# Patient Record
Sex: FEMALE | Race: WHITE
[De-identification: names, ages, dates, MRNs, and addresses within clinical notes are randomized per-mention and may not be internally consistent; named-entity substitution may affect disease eponyms.]

---

## 2017-06-05 ENCOUNTER — HOSPITAL ENCOUNTER (INPATIENT)
Dept: HOSPITAL 47 - EC | Age: 72
LOS: 2 days | Discharge: HOME | DRG: 310 | End: 2017-06-07
Payer: MEDICARE

## 2017-06-05 VITALS — BODY MASS INDEX: 26.4 KG/M2

## 2017-06-05 DIAGNOSIS — Z79.01: ICD-10-CM

## 2017-06-05 DIAGNOSIS — K21.9: ICD-10-CM

## 2017-06-05 DIAGNOSIS — I25.10: ICD-10-CM

## 2017-06-05 DIAGNOSIS — Z79.899: ICD-10-CM

## 2017-06-05 DIAGNOSIS — I49.5: ICD-10-CM

## 2017-06-05 DIAGNOSIS — Z90.12: ICD-10-CM

## 2017-06-05 DIAGNOSIS — M19.90: ICD-10-CM

## 2017-06-05 DIAGNOSIS — Z80.41: ICD-10-CM

## 2017-06-05 DIAGNOSIS — E78.5: ICD-10-CM

## 2017-06-05 DIAGNOSIS — Z79.82: ICD-10-CM

## 2017-06-05 DIAGNOSIS — Z87.01: ICD-10-CM

## 2017-06-05 DIAGNOSIS — N18.3: ICD-10-CM

## 2017-06-05 DIAGNOSIS — Z88.8: ICD-10-CM

## 2017-06-05 DIAGNOSIS — Z92.21: ICD-10-CM

## 2017-06-05 DIAGNOSIS — Z86.69: ICD-10-CM

## 2017-06-05 DIAGNOSIS — R74.8: ICD-10-CM

## 2017-06-05 DIAGNOSIS — Z87.19: ICD-10-CM

## 2017-06-05 DIAGNOSIS — I12.9: ICD-10-CM

## 2017-06-05 DIAGNOSIS — I48.1: ICD-10-CM

## 2017-06-05 DIAGNOSIS — Z80.49: ICD-10-CM

## 2017-06-05 DIAGNOSIS — Z86.19: ICD-10-CM

## 2017-06-05 DIAGNOSIS — Z82.49: ICD-10-CM

## 2017-06-05 DIAGNOSIS — Z86.73: ICD-10-CM

## 2017-06-05 DIAGNOSIS — I42.9: ICD-10-CM

## 2017-06-05 DIAGNOSIS — I48.2: Primary | ICD-10-CM

## 2017-06-05 DIAGNOSIS — H26.9: ICD-10-CM

## 2017-06-05 DIAGNOSIS — Z86.79: ICD-10-CM

## 2017-06-05 DIAGNOSIS — Z95.0: ICD-10-CM

## 2017-06-05 DIAGNOSIS — Z80.0: ICD-10-CM

## 2017-06-05 DIAGNOSIS — E87.6: ICD-10-CM

## 2017-06-05 DIAGNOSIS — Z83.49: ICD-10-CM

## 2017-06-05 DIAGNOSIS — Z85.3: ICD-10-CM

## 2017-06-05 DIAGNOSIS — I49.3: ICD-10-CM

## 2017-06-05 LAB — CK SERPL-CCNC: 54 U/L (ref 30–135)

## 2017-06-05 PROCEDURE — 84484 ASSAY OF TROPONIN QUANT: CPT

## 2017-06-05 PROCEDURE — 93306 TTE W/DOPPLER COMPLETE: CPT

## 2017-06-05 PROCEDURE — 84443 ASSAY THYROID STIM HORMONE: CPT

## 2017-06-05 PROCEDURE — 84132 ASSAY OF SERUM POTASSIUM: CPT

## 2017-06-05 PROCEDURE — 96365 THER/PROPH/DIAG IV INF INIT: CPT

## 2017-06-05 PROCEDURE — 96366 THER/PROPH/DIAG IV INF ADDON: CPT

## 2017-06-05 PROCEDURE — 82550 ASSAY OF CK (CPK): CPT

## 2017-06-05 PROCEDURE — 80053 COMPREHEN METABOLIC PANEL: CPT

## 2017-06-05 PROCEDURE — 85025 COMPLETE CBC W/AUTO DIFF WBC: CPT

## 2017-06-05 PROCEDURE — 36415 COLL VENOUS BLD VENIPUNCTURE: CPT

## 2017-06-05 PROCEDURE — 93005 ELECTROCARDIOGRAM TRACING: CPT

## 2017-06-05 PROCEDURE — 99285 EMERGENCY DEPT VISIT HI MDM: CPT

## 2017-06-05 PROCEDURE — 82553 CREATINE MB FRACTION: CPT

## 2017-06-05 PROCEDURE — 83735 ASSAY OF MAGNESIUM: CPT

## 2017-06-05 RX ADMIN — LISINOPRIL SCH MG: 20 TABLET ORAL at 20:08

## 2017-06-05 RX ADMIN — METOPROLOL TARTRATE SCH MG: 50 TABLET, FILM COATED ORAL at 20:09

## 2017-06-05 NOTE — ED
General Adult HPI





- General


Chief complaint: Chest Pain


Stated complaint: weakness, afib, pacemaker problem


Time Seen by Provider: 06/05/17 16:14


Source: EMS


Mode of arrival: EMS


Limitations: no limitations, altered mental status





- History of Present Illness


Initial comments: 





71-year-old white female presents as a transfer from [facility].  She barely presented to their facility with atrial 

fibrillation with rapid ventricular response.  She states that this morning she 

developed some dizziness to the point that she felt as though she may pass out.

  She also was short of breath.  She denies any chest pain.  She otherwise had 

been doing just fine and denies any other medical complaints.  She does have a 

history of a pacemaker being placed approximately 9 months ago in [place].  She 

also has a history of atrial fibrillation.  Her primary cardiologist is Dr. Mcdonald.  She initially received 5 mg of Lopressor at the other facility without 

any change in the heart rate.  She was started on some Cardizem but developed a 

rash as well as some itching and this was stopped.  She received some Benadryl 

afterwards with relief.  She then was started on amiodarone intravenously.  She 

denies any further shortness of breath or dizziness but upon arrival her heart 

rate still is approximately 140.  No other complaints or modifying factors.





- Related Data


 Home Medications











 Medication  Instructions  Recorded  Confirmed


 


Apixaban [Eliquis] 5 mg PO DAILY 06/05/17 06/05/17


 


Aspirin 81 mg PO DAILY 06/05/17 06/05/17


 


Carvedilol [Coreg] 25 mg PO BID 06/05/17 06/05/17


 


Furosemide [Lasix] 40 mg PO BID 06/05/17 06/05/17


 


Lisinopril 20 mg PO BID 06/05/17 06/05/17


 


hydrALAZINE HCL [Apresoline] 25 mg PO TID 06/05/17 06/05/17











 Allergies











Allergy/AdvReac Type Severity Reaction Status Date / Time


 


amlodipine Allergy  Rash/Hives Verified 06/05/17 17:37


 


diltiazem HCl [From Cardizem] Allergy  Rash/Hives Verified 06/05/17 17:37


 


hydrochlorothiazide AdvReac  Vertigo Verified 06/05/17 17:37


 


rivaroxaban [From Xarelto] AdvReac  Vertigo Verified 06/05/17 17:37


 


triamterene [From Dyazide] AdvReac  Vertigo Verified 06/05/17 17:37














Review of Systems


ROS Statement: 


Those systems with pertinent positive or pertinent negative responses have been 

documented in the HPI.





ROS Other: All systems not noted in ROS Statement are negative.





Past Medical History


Past Medical History: Atrial Fibrillation, Cancer, Heart Failure, CVA/TIA, GERD/

Reflux, Hypertension, Pneumonia


Additional Past Medical History / Comment(s): Afib with RVR in the past, left 

breast CA with surgery/chemo, TIA, right lower lobe pneumonia, cataracts 

bilaterally, anemia, tinnitis bilaterally, numbness/tingling at times bilateral 

hands, hemorrhoids.


History of Any Multi-Drug Resistant Organisms: ESBL


Date of last positivie culture/infection: 6/9/16 ESBL-E.coli


MDRO Source:: Urine


Past Surgical History: Heart Catheterization, Pacemaker


Additional Past Surgical History / Comment(s): 12/29/15  cardiac cath-treated 

medically, left mastectomy, tumor removed behind pt right ear, colonoscopy. 

pacemaker 8/2016


Past Anesthesia/Blood Transfusion Reactions: Motion Sickness


Additional Past Anesthesia/Blood Transfusion Reaction / Comment(s): Difficulty 

waking up after one surgery.


Past Psychological History: No Psychological Hx Reported


Additional Psychological History / Comment(s): Pt resides alone.  She is 

independent.  She uses no assistive device.  She drives.


Smoking Status: Never smoker


Past Alcohol Use History: None Reported


Past Drug Use History: None Reported





- Past Family History


  ** Father


Family Medical History: Myocardial Infarction (MI)





  ** Mother


Family Medical History: Cancer


Additional Family Medical History / Comment(s): colon cancer





  ** Sister(s)


Family Medical History: Cancer, Thyroid Disorder


Additional Family Medical History / Comment(s): cervical cancer and ovarian 

cancer





General Exam





- General Exam Comments


Initial Comments: 





GENERAL: The patient is well nourished and well hydrated. 


VITAL SIGNS: Heart rate, blood pressure, respiratory rate reviewed as recorded 

in nurse's notes. 


EYES: Pupils are round and reactive. Extraocular movements are intact. No 

conjunctival / lid redness or swelling. 


ENT: No external evidence of injury, swelling, or ecchymosis. Airway is patent. 

Throat is clear. 


NECK: Nontender. No swelling or evidence of injury. No subcutaneous emphysema. 

Trachea is midline. No thyroid mass. 


HEART: Tachycardic and irregular heart rate.  Good peripheral pulses. 


LUNGS/CHEST: Breath sounds clear and equal bilaterally. No rales, rhonchi, or 

wheezes. No ecchymosis, subcutaneous emphysema, or tenderness. 


ABDOMEN: Abdomen soft without tenderness. No palpable masses or organomegaly. 

No peritoneal signs. No abdominal wall swelling or ecchymosis. 


EXTREMITIES: No extremity tenderness. Normal muscle tone and function. No 

thoracolumbar tenderness. 


NEUROLOGIC: Sensation is grossly intact. Cranial nerve exam reveals face is 

symmetrical, tongue is midline, speech is clear. 


SKIN: No abrasions or ecchymosis is noted. No induration or masses noted. 


PSYCHIATRIC: Alert and oriented. Appropriate behavior and judgment.





Limitations: no limitations, altered mental status





Course


 Vital Signs











  06/05/17 06/05/17 06/05/17





  16:12 16:54 17:23


 


Temperature 98.7 F  


 


Pulse Rate 120 H 133 H 138 H


 


Respiratory 18 18 





Rate   


 


Blood Pressure 166/95 161/89 144/96


 


O2 Sat by Pulse 98 98 98





Oximetry   














  06/05/17 06/05/17





  17:56 18:23


 


Temperature  


 


Pulse Rate 136 H 126 H


 


Respiratory  





Rate  


 


Blood Pressure 176/87 139/79


 


O2 Sat by Pulse 98 98





Oximetry  














Medical Decision Making





- Medical Decision Making





The patient was seen and examined.  All diagnostics were reviewed.  She does 

present with records from [place].  The hospitalization are reviewed.  She 

apparently had an elevation of her troponin per records.  The troponin was 

found to be 0.28 with the high end of normal being 0.04.  The potassium was 

slightly low at 3.2.  The glucose was slightly elevated at 144.  The amiodarone 

drip apparently backed up into the IV bag so she is not on the amiodarone upon 

arrival.  This is restarted.The troponin was found to be 0.454 on recheck which 

is slightly elevated.  The patient's heart rate on recheck is approximately 125 

and still appears to be in atrial fibrillation with rapid ventricular response.

  The case is discussed with Dr. Mcdonald and he would like metoprolol 50 mg twice 

a day added to her regimen.  She is doing well on recheck.  She states that she 

has had occasional episodes of dizziness but otherwise no other complaints.  It 

is felt as though she would require admission to the hospital.  The case is 

discussed with Maude from internal medicine and she is agreeable to admission.  

Approximately 30 minutes of critical care time is utilized and the treatment of 

this patient.





- Lab Data


 Lab Results











  06/05/17 Range/Units





  16:27 


 


Troponin I  0.454 H*  (0.000-0.034)  ng/mL














Disposition


Clinical Impression: 


 Atrial fibrillation with rapid ventricular response, Dyspnea, Dizziness, 

Elevated troponin





Disposition: ADMITTED AS IP TO THIS HOSP


Condition: Fair


Time of Disposition: 18:44


Decision Date: 06/05/17


Decision Time: 18:44

## 2017-06-06 LAB
ALP SERPL-CCNC: 68 U/L (ref 38–126)
ALT SERPL-CCNC: 31 U/L (ref 9–52)
ANION GAP SERPL CALC-SCNC: 5 MMOL/L
AST SERPL-CCNC: 26 U/L (ref 14–36)
BASOPHILS # BLD AUTO: 0 K/UL (ref 0–0.2)
BASOPHILS NFR BLD AUTO: 0 %
BUN SERPL-SCNC: 30 MG/DL (ref 7–17)
CALCIUM SPEC-MCNC: 9.3 MG/DL (ref 8.4–10.2)
CH: 33.1
CHCM: 35
CHLORIDE SERPL-SCNC: 112 MMOL/L (ref 98–107)
CK SERPL-CCNC: 57 U/L (ref 30–135)
CO2 SERPL-SCNC: 27 MMOL/L (ref 22–30)
EOSINOPHIL # BLD AUTO: 0.4 K/UL (ref 0–0.7)
EOSINOPHIL NFR BLD AUTO: 4 %
ERYTHROCYTE [DISTWIDTH] IN BLOOD BY AUTOMATED COUNT: 3.54 M/UL (ref 3.8–5.4)
ERYTHROCYTE [DISTWIDTH] IN BLOOD: 12.6 % (ref 11.5–15.5)
GLUCOSE SERPL-MCNC: 97 MG/DL (ref 74–99)
HCT VFR BLD AUTO: 33.6 % (ref 34–46)
HDW: 2.74
HGB BLD-MCNC: 11.7 GM/DL (ref 11.4–16)
LUC NFR BLD AUTO: 1 %
LYMPHOCYTES # SPEC AUTO: 2.3 K/UL (ref 1–4.8)
LYMPHOCYTES NFR SPEC AUTO: 23 %
MCH RBC QN AUTO: 33 PG (ref 25–35)
MCHC RBC AUTO-ENTMCNC: 34.7 G/DL (ref 31–37)
MCV RBC AUTO: 94.9 FL (ref 80–100)
MONOCYTES # BLD AUTO: 0.6 K/UL (ref 0–1)
MONOCYTES NFR BLD AUTO: 6 %
NEUTROPHILS # BLD AUTO: 6.5 K/UL (ref 1.3–7.7)
NEUTROPHILS NFR BLD AUTO: 66 %
NON-AFRICAN AMERICAN GFR(MDRD): 49
POTASSIUM SERPL-SCNC: 3.4 MMOL/L (ref 3.5–5.1)
PROT SERPL-MCNC: 5.7 G/DL (ref 6.3–8.2)
SODIUM SERPL-SCNC: 144 MMOL/L (ref 137–145)
TROPONIN I SERPL-MCNC: 0.46 NG/ML (ref 0–0.03)
TROPONIN I SERPL-MCNC: 0.52 NG/ML (ref 0–0.03)
WBC # BLD AUTO: 0.13 10*3/UL
WBC # BLD AUTO: 9.9 K/UL (ref 3.8–10.6)
WBC (PEROX): 9.66

## 2017-06-06 RX ADMIN — FUROSEMIDE SCH MG: 40 TABLET ORAL at 16:11

## 2017-06-06 RX ADMIN — ASPIRIN 81 MG CHEWABLE TABLET SCH MG: 81 TABLET CHEWABLE at 09:24

## 2017-06-06 RX ADMIN — AMIODARONE HYDROCHLORIDE SCH MLS/HR: 50 INJECTION, SOLUTION INTRAVENOUS at 09:25

## 2017-06-06 RX ADMIN — AMIODARONE HYDROCHLORIDE SCH MG: 200 TABLET ORAL at 11:53

## 2017-06-06 RX ADMIN — POTASSIUM CHLORIDE SCH MEQ: 20 TABLET, EXTENDED RELEASE ORAL at 06:24

## 2017-06-06 RX ADMIN — FUROSEMIDE SCH MG: 40 TABLET ORAL at 09:24

## 2017-06-06 RX ADMIN — METOPROLOL TARTRATE SCH MG: 50 TABLET, FILM COATED ORAL at 09:23

## 2017-06-06 RX ADMIN — LISINOPRIL SCH MG: 20 TABLET ORAL at 20:53

## 2017-06-06 RX ADMIN — METOPROLOL TARTRATE SCH MG: 50 TABLET, FILM COATED ORAL at 16:11

## 2017-06-06 RX ADMIN — AMIODARONE HYDROCHLORIDE SCH: 50 INJECTION, SOLUTION INTRAVENOUS at 18:01

## 2017-06-06 RX ADMIN — POTASSIUM CHLORIDE SCH MEQ: 20 TABLET, EXTENDED RELEASE ORAL at 07:53

## 2017-06-06 RX ADMIN — LISINOPRIL SCH MG: 20 TABLET ORAL at 09:23

## 2017-06-06 RX ADMIN — METOPROLOL TARTRATE SCH MG: 50 TABLET, FILM COATED ORAL at 20:55

## 2017-06-06 RX ADMIN — AMIODARONE HYDROCHLORIDE SCH MG: 200 TABLET ORAL at 20:53

## 2017-06-06 RX ADMIN — APIXABAN SCH MG: 5 TABLET, FILM COATED ORAL at 09:23

## 2017-06-06 RX ADMIN — AMIODARONE HYDROCHLORIDE SCH MLS/HR: 50 INJECTION, SOLUTION INTRAVENOUS at 01:14

## 2017-06-06 NOTE — CONS
DATE OF CONSULTATION:  



CHIEF COMPLAINT:  Dizziness and fast heart rate.



Kirsty is a 71-year-old lady with history of chronic atrial 

fibrillation and hypertension.  Patient has history of chronic atrial 

fibrillation, sick sinus syndrome, status post permanent pacemaker, 

known cardiomyopathy with chronic atrial fibrillation, coronary artery 

disease who presented to the hospital complaining of dizziness and was 

found to be in atrial fibrillation with rapid ventricular rate. At the 

time of my evaluation this morning, she is in atrial fibrillation with 

fairly well controlled ventricular rate and blood pressure is normal.  



She is on Eliquis, Apresoline, Lopressor 50 b.i.d. with amiodarone. 



Patient is allergic to AMLODIPINE, HYDROCHLOROTHIAZIDE, XARELTO and 

DYAZIDE.  



Medications at home included hydralazine 25 t.i.d., lisinopril 20 

b.i.d., Lasix 40 b.i.d., Coreg 25 b.i.d. aspirin and Eliquis.  



Family history is negative for premature coronary artery disease. 



Social history is negative for current smoking, EtOH abuse, or drug 

abuse.  



REVIEW OF SYSTEMS: 

HEENT is unremarkable. 

CARDIAC: As described above. 

RESPIRATORY: Negative. 

GI: Negative. 

GENITOURINARY: Negative. 

ALLERGY/IMMUNOLOGY: Negative. 

SKIN: Negative. 

MUSCULOSKELETAL: Significant for arthritis. 

PSYCHOSOCIAL: Negative. 

ENDOCRINE:  Negative. 

CONSTITUTIONAL: Negative. 

ONCOLOGICAL: Negative. 



The rest of the system review is not relevant.



On exam, patient is comfortable at rest. Heart rate is around 112 

beats per minute, blood pressure is 140/95, respiratory rate is 18, O2 

sat is 98% on room air.  

There is no jugular venous distention. Carotid upstroke is normal. 

There is no bruit. Chest exam reveals diminished air entry at the 

bases. Heart exam reveals first and second heart sound with systolic 

murmur at the apex. Abdomen is soft. Exam of the extremities did not 

reveal edema. Peripheral pulses are felt.  



Labs show a hemoglobin of 11.7. Three sets of troponins are 0.4, 0.5 

and 0.4. Potassium is 3.4. Creatinine is 1.1.  



ASSESSMENT: 

1. Chronic atrial fibrillation with poorly-controlled ventricular rate. 

2. Sick sinus syndrome, status post permanent pacemaker placement. 



PLAN: I am going to add amiodarone 200 mg b.i.d. and increase the dose 

of Lopressor to 50 t.i.d. We should be able to discharge her home over 

the next day or two.

## 2017-06-07 VITALS — HEART RATE: 63 BPM | SYSTOLIC BLOOD PRESSURE: 150 MMHG | DIASTOLIC BLOOD PRESSURE: 79 MMHG

## 2017-06-07 VITALS — TEMPERATURE: 97 F | RESPIRATION RATE: 18 BRPM

## 2017-06-07 LAB
ALP SERPL-CCNC: 72 U/L (ref 38–126)
ALT SERPL-CCNC: 32 U/L (ref 9–52)
ANION GAP SERPL CALC-SCNC: 8 MMOL/L
AST SERPL-CCNC: 43 U/L (ref 14–36)
BASOPHILS # BLD AUTO: 0.1 K/UL (ref 0–0.2)
BASOPHILS NFR BLD AUTO: 1 %
BUN SERPL-SCNC: 30 MG/DL (ref 7–17)
CALCIUM SPEC-MCNC: 9.3 MG/DL (ref 8.4–10.2)
CH: 32.9
CHCM: 33.4
CHLORIDE SERPL-SCNC: 113 MMOL/L (ref 98–107)
CO2 SERPL-SCNC: 22 MMOL/L (ref 22–30)
EOSINOPHIL # BLD AUTO: 0.4 K/UL (ref 0–0.7)
EOSINOPHIL NFR BLD AUTO: 4 %
ERYTHROCYTE [DISTWIDTH] IN BLOOD BY AUTOMATED COUNT: 3.61 M/UL (ref 3.8–5.4)
ERYTHROCYTE [DISTWIDTH] IN BLOOD: 13.1 % (ref 11.5–15.5)
GLUCOSE SERPL-MCNC: 112 MG/DL (ref 74–99)
HCT VFR BLD AUTO: 35.8 % (ref 34–46)
HDW: 2.52
HGB BLD-MCNC: 11.9 GM/DL (ref 11.4–16)
LUC NFR BLD AUTO: 1 %
LYMPHOCYTES # SPEC AUTO: 2.3 K/UL (ref 1–4.8)
LYMPHOCYTES NFR SPEC AUTO: 24 %
MCH RBC QN AUTO: 32.9 PG (ref 25–35)
MCHC RBC AUTO-ENTMCNC: 33.2 G/DL (ref 31–37)
MCV RBC AUTO: 99.1 FL (ref 80–100)
MONOCYTES # BLD AUTO: 0.3 K/UL (ref 0–1)
MONOCYTES NFR BLD AUTO: 3 %
NEUTROPHILS # BLD AUTO: 6.4 K/UL (ref 1.3–7.7)
NEUTROPHILS NFR BLD AUTO: 68 %
NON-AFRICAN AMERICAN GFR(MDRD): 52
POTASSIUM SERPL-SCNC: 4.9 MMOL/L (ref 3.5–5.1)
PROT SERPL-MCNC: 6.4 G/DL (ref 6.3–8.2)
SODIUM SERPL-SCNC: 143 MMOL/L (ref 137–145)
WBC # BLD AUTO: 0.08 10*3/UL
WBC # BLD AUTO: 9.4 K/UL (ref 3.8–10.6)
WBC (PEROX): 8.78

## 2017-06-07 RX ADMIN — APIXABAN SCH MG: 5 TABLET, FILM COATED ORAL at 08:04

## 2017-06-07 RX ADMIN — ASPIRIN 81 MG CHEWABLE TABLET SCH MG: 81 TABLET CHEWABLE at 08:04

## 2017-06-07 RX ADMIN — LISINOPRIL SCH MG: 20 TABLET ORAL at 08:05

## 2017-06-07 RX ADMIN — METOPROLOL TARTRATE SCH MG: 50 TABLET, FILM COATED ORAL at 08:05

## 2017-06-07 RX ADMIN — AMIODARONE HYDROCHLORIDE SCH MG: 200 TABLET ORAL at 08:03

## 2017-06-07 RX ADMIN — FUROSEMIDE SCH MG: 40 TABLET ORAL at 08:04

## 2017-06-07 NOTE — HP
DATE OF ADMISSION:  06/05/2017





REASON FOR ADMISSION: Syncope. 



HISTORY OF PRESENT ILLNESS: This is a 71 -year-old lady with history 

of chronic atrial fibrillation, status post permanent pacemaker 

placement due to sick sinus syndrome, comes in the hospital with an 

episode of syncope. Patient stated that she noted diffuse (    ) she 

noted lightheadedness thereafter, lost consciousness for a brief 

period.  Patient did state to have palpitations during that period.  



The patient came into the hospital with complaints of lightheadedness. 

Patient states that she lost consciousness briefly, thereafter was 

noted to have regained consciousness, however, was able to call 911 

and hence, came into the emergency room via EMS.  



Patient was noted to be in atrial fibrillation with rapid ventricular 

rate. Blood pressure was within normal limits.  



A fourteen-point review of systems was done; none pertinent other than 

ones mentioned above.  



Home medications include: 

1. Hydralazine.

2. Lisinopril.

3. Lasix.

4. Coreg.

5. Aspirin.

6. Eliquis. 



ALLERGIES: AMLODIPINE, CARDIZEM, HYDROCHLOROTHIAZIDE, XARELTO, 

TRIAMTERENE.  



Past medical history includes chronic atrial fibrillation, 

hypertension, and chronic atrial fibrillation.  



PAST SURGICAL HISTORY: Pacemaker placement, cardiac catheterization, 

colonoscopy and previous left-sided mastectomy, previous tumor removal 

behind the right ear. 



SOCIAL HISTORY: Lifelong nonsmoker. No alcohol or illicit drug use 

reported.  



FAMILY HISTORY: Significant cervical and ovarian cancer, however, is 

not pertinent to current admission.  



PHYSICAL EXAM:

VITALS: Temperature is 97.4, heart rate is between 105 to 120, 

respiratory rate 18, blood pressure 142/83, saturating 98% on room 

air.  

GENERAL APPEARANCE: Alert, oriented x3. Does not appear to be in 

distress.  

NECK: Supple. No JVD. 

HEART: Irregularly irregular. No murmurs appreciated. 

LUNGS: Good air movement. Clear to auscultation. No rhonchi, wheezing 

or crackles.  

ABDOMEN: Soft, nontender, no organomegaly. Bowel sounds intact.

NEUROLOGICAL: No focal motor or sensory deficits noted. 

LOWER EXTREMITIES: No edema noted. 



Laboratory data include hemoglobin 11.1, hematocrit 32.6, white count 

9.9, platelets of 217, sodium 144, potassium 3.24, chloride 112, 

bicarb 27. BUN 30, creatinine of 1.10, peak troponin of 0.518.  



ASSESSMENT AND PLAN:

1. Atrial fibrillation with rapid ventricular rate in a patient with 

chronic atrial fibrillation.  

2. Sick sinus syndrome. 

3. Chronic kidney disease Stage III. 

4. Troponin leak, likely due to tachyarrhythmia. 

5. History of hypertension. 

6. Previous breast cancer status post left-sided mastectomy. 

7. Hypokalemia. 



PLAN: Continue amiodarone drip as recommended by cardiology. We will 

obtain a TSH for the a.m. Echocardiogram was also ordered. Once the 

patient is better rate controlled, will likely be able to go home. 

Patient was already anticoagulated. Will follow.

## 2017-06-07 NOTE — P.PN
Subjective


Principal diagnosis: 





Atrial fibrillation


This is a 71-year-old  female with history of chronic persistent 

atrial fibrillation, hypertension, sick sinus syndrome with prior pacemaker 

implantation, with follows regularly with Dr. Cade in the office.  She 

presented to the hospital with symptoms of dizziness and heart racing.  She was 

found to be in atrial fibrillation with a rapid ventricular response.  Patient 

was seen in consultation yesterday by Dr. Gamez.  Amiodarone 200 mg one tablet 

by mouth twice a day was added to her medication regime, and her dose of 

Lopressor increased to 50 mg by mouth 3 times a day.  Patient's potassium was 

also found to be low at 3.4 and was replaced.  Heart rate has been under 

adequate control, pacemaker has been functioning appropriately.  Blood pressure 

148/90 heart rate in the 90s.  Potassium today 4.9, BUN 30, creatinine 1.0.  

Shunt was seen and examined, feels well today, denies any dizziness, no 

palpitations.





Objective





- Vital Signs


Vital signs: 


 Vital Signs











Temp  97.5 F L  06/07/17 03:47


 


Pulse  110 H  06/07/17 03:47


 


Resp  17   06/07/17 03:47


 


BP  160/84   06/07/17 03:47


 


Pulse Ox  95   06/07/17 03:47








 Intake & Output











 06/06/17 06/07/17 06/07/17





 18:59 06:59 18:59


 


Intake Total 1275.244 480 


 


Output Total 0  


 


Balance 1275.244 480 


 


Weight  68.1 kg 


 


Intake:   


 


  IV  0 


 


    0.9 ns  0 


 


  Intake, IV Titration 141.244  





  Amount   


 


    Amiodarone 450 mg In 141.244  





    Dextrose 5% in Water 250   





    ml @ 1 MG/MIN 34.53 mls/   





    hr IV .[id number] [facility] Rx#:   





    [id number]   


 


  Oral 1134 480 


 


Output:   


 


  Urine 0  


 


Other:   


 


  Voiding Method  Toilet 


 


  # Voids  2 














- Exam


PHYSICAL EXAMINATION: 





HEENT: Head is atraumatic, normocephalic.  Pupils equal, round.  Neck is 

supple.  There is no elevated jugular venous pressure.





HEART EXAMINATION: Heart S1 and S2 irregularly irregular a systolic murmur is 

heard.





CHEST EXAMINATION: Lungs are clear with diminished air entry to bilateral bases.





ABDOMEN:  Soft, nontender. Bowel sounds are heard. No organomegaly noted.


 


EXTREMITIES: 2+ peripheral pulses with no evidence of peripheral edema and no 

calf tenderness noted.





NEUROLOGIC patient is awake, alert and oriented -3.


 


.


 











- Labs


CBC & Chem 7: 


 06/07/17 06:13





 06/07/17 06:13


Labs: 


 Abnormal Lab Results - Last 24 Hours (Table)











  06/07/17 06/07/17 Range/Units





  06:13 06:13 


 


RBC  3.61 L   (3.80-5.40)  m/uL


 


Chloride   113 H  ()  mmol/L


 


BUN   30 H  (7-17)  mg/dL


 


Creatinine   1.05 H  (0.52-1.04)  mg/dL


 


Glucose   112 H  (74-99)  mg/dL


 


AST   43 H  (14-36)  U/L


 


Albumin   3.4 L  (3.5-5.0)  g/dL














Assessment and Plan


(1) SSS (sick sinus syndrome)


Status: Acute   





(2) Pacemaker


Status: Acute   





(3) Atrial fibrillation with rapid ventricular response


Status: Acute   





(4) Dizziness


Status: Acute   





(5) CAD (coronary artery disease)


Status: Acute   





(6) Cardiomyopathy


Status: Acute   





(7) Chronic a-fib


Status: Acute   





(8) HTN (hypertension)


Status: Acute   





(9) Hyperlipemia


Status: Acute   


Plan: 


From cardiology's perspective, patient may be able to be discharged home on her 

current medications.  We will make her a follow-up appointment with Dr. Mcdonald 

in the office in one week.








DNP note has been reviewed, I agree with a documented findings and plan of 

care.  Patient was seen and examined.

## 2017-06-07 NOTE — ECHOF
Referral Reason:afib



MEASUREMENTS

--------

HEIGHT: 152.4 cm

WEIGHT: 63.0 kg

BP: 120/40

IVSd:   1.3 cm     (0.6 - 1.1)

LVIDd:   4.8 cm     (3.9 - 5.3)

LVPWd:   1.5 cm     (0.6 - 1.1)

IVSs:   1.3 cm

LVIDs:   4.1 cm

LVPWs:   1.8 cm

LA Diam:   3.9 cm     (2.7 - 3.8)

LAESV Index (A-L):   53.56 ml/m

Ao Diam:   3.1 cm     (2.0 - 3.7)

AV Cusp:   1.5 cm     (1.5 - 2.6)

LA Diam:   4.3 cm     (2.7 - 3.8)

MV EXCURSION:   19.089 mm     (> 18.000)

MV EF SLOPE:   93 mm/s     (70 - 150)

EPSS:   1.4 cm

MV E Juan Luis:   1.02 m/s

MV DecT:   191 ms

MV A Juan Luis:   0.55 m/s

MV E/A Ratio:   1.87 

RAP:   5.00 mmHg

RVSP:   42.65 mmHg







FINDINGS

--------

Paced rhythm.

This was a technically adequate study.

Left ventricular wall thickness is normal.   There is 

severe global hypokinesis of LV .   Overall left 

ventricular systolic function is severely impaired 

with, an EF < 20%.

The right ventricle is normal in size.

LA is severely dilated >40 ml/m2

The right atrial size is normal.

There is mild aortic valve sclerosis.   There is no 

evidence of aortic regurgitation.

Mild mitral annular calcification present.   Mild 

mitral regurgitation is present.

Mild tricuspid regurgitation present.   There is mild 

pulmonary hypertension.   The right ventricular 

systolic pressure, as measured by Doppler, is 42.65mmHg.

There is no pulmonic regurgitation present.

The aortic root size is normal.

There is no pericardial effusion.



CONCLUSIONS

--------

1. Left ventricular wall thickness is normal.

2. The right ventricular systolic pressure, as measured by 

Doppler, is 42.65mmHg.

3. There is no pulmonic regurgitation present.

4. The aortic root size is normal.

5. There is no pericardial effusion.

6. There is severe global hypokinesis of LV .

7. Overall left ventricular systolic function is severely 

impaired with, an EF < 20%.

8. LA is severely dilated >40 ml/m2

9. There is mild aortic valve sclerosis.

10. Mild mitral annular calcification present.

11. Mild mitral regurgitation is present.

12. Mild tricuspid regurgitation present.

13. There is mild pulmonary hypertension.





SONOGRAPHER: Marianne Hanna RDCS

## 2017-06-07 NOTE — P.DS
Providers


Date of admission: 


06/05/17 18:45





Attending physician: 


Stefania Zurita





Consults: 





 





06/05/17 18:46


Consult Physician Routine 


   Consulting Provider: Matt Cade


   Consult Reason/Comments: a-fib with rvr, increased troponin


   Do you want consulting provider notified?: Already Contacted











Primary care physician: 


Ricki Morrow





Hospital Course: 





 This is a 71 -year-old lady with history 


of chronic atrial fibrillation, status post permanent pacemaker 


placement due to sick sinus syndrome, comes in the hospital with an 


episode of syncope. Patient stated that she noted diffuse (    ) she 


noted lightheadedness thereafter, lost consciousness for a brief 


period.  Patient did state to have palpitations during that period.  





The patient came into the hospital with complaints of lightheadedness. 


Patient states that she lost consciousness briefly, thereafter was 


noted to have regained consciousness, however, was able to call 911 


and hence, came into the emergency room via EMS.  





Patient was noted to be in atrial fibrillation with rapid ventricular 


rate. Blood pressure was within normal limits.  








Day of discharge





doing well


no similar episodes are reported





Rate controlled a fib on telemetry





PHYSICAL EXAM:


VITALS: Temperature is 97.4, heart rate is between 105 to 120, 


respiratory rate 18, blood pressure 142/83, saturating 98% on room 


air.  


GENERAL APPEARANCE: Alert, oriented x3. Does not appear to be in 


distress.  


NECK: Supple. No JVD. 


HEART: Irregularly irregular. No murmurs appreciated. 


LUNGS: Good air movement. Clear to auscultation. No rhonchi, wheezing 


or crackles.  


ABDOMEN: Soft, nontender, no organomegaly. Bowel sounds intact.


NEUROLOGICAL: No focal motor or sensory deficits noted. 


LOWER EXTREMITIES: No edema noted. 








ASSESSMENT AND PLAN:


1. Atrial fibrillation with rapid ventricular rate in a patient with 


chronic atrial fibrillation.  





rate controlled after iv amiodarone infusion one 24 hrs per protocol





dc home on 200mg daily and change b blocker to metoprolol








2. Sick sinus syndrome. 


3. Chronic kidney disease Stage III. 


4. Troponin leak, likely due to tachyarrhythmia. 


5. History of hypertension. 


6. Previous breast cancer status post left-sided mastectomy. 


7. Hypokalemia. 











Patient Condition at Discharge: Fair





Plan - Discharge Summary


New Discharge Prescriptions: 


New


   RX: Amiodarone [Cordarone] 200 mg PO DAILY #30 tab


   RX: Metoprolol Tartrate [Lopressor] 50 mg PO TID #30 tab





Continue


   RX: Aspirin 81 mg PO DAILY


   RX: hydrALAZINE HCL [Apresoline] 25 mg PO TID


   RX: Lisinopril 20 mg PO BID


   RX: Furosemide [Lasix] 40 mg PO BID


   RX: Apixaban [Eliquis] 5 mg PO DAILY





Discontinued


   Carvedilol [Coreg] 25 mg PO BID


Discharge Medication List





RX: Apixaban [Eliquis] 5 mg PO DAILY 06/05/17 [History]


RX: Aspirin 81 mg PO DAILY 06/05/17 [History]


RX: Furosemide [Lasix] 40 mg PO BID 06/05/17 [History]


RX: Lisinopril 20 mg PO BID 06/05/17 [History]


RX: hydrALAZINE HCL [Apresoline] 25 mg PO TID 06/05/17 [History]


RX: Amiodarone [Cordarone] 200 mg PO DAILY #30 tab 06/07/17 [Rx]


RX: Metoprolol Tartrate [Lopressor] 50 mg PO TID #30 tab 06/07/17 [Rx]








Follow up Appointment(s)/Referral(s): 


Matt Cade MD [STAFF PHYSICIAN] - 06/12/17 3:30 pm


Discharge Disposition: HOME SELF-CARE

## 2018-05-07 ENCOUNTER — HOSPITAL ENCOUNTER (INPATIENT)
Dept: HOSPITAL 47 - 6SEL | Age: 73
LOS: 3 days | Discharge: HOME | DRG: 309 | End: 2018-05-10
Attending: INTERNAL MEDICINE | Admitting: INTERNAL MEDICINE
Payer: MEDICARE

## 2018-05-07 DIAGNOSIS — I10: ICD-10-CM

## 2018-05-07 DIAGNOSIS — K21.9: ICD-10-CM

## 2018-05-07 DIAGNOSIS — Z91.14: ICD-10-CM

## 2018-05-07 DIAGNOSIS — Z86.73: ICD-10-CM

## 2018-05-07 DIAGNOSIS — Z80.0: ICD-10-CM

## 2018-05-07 DIAGNOSIS — E78.5: ICD-10-CM

## 2018-05-07 DIAGNOSIS — Z90.12: ICD-10-CM

## 2018-05-07 DIAGNOSIS — I42.9: ICD-10-CM

## 2018-05-07 DIAGNOSIS — Z79.01: ICD-10-CM

## 2018-05-07 DIAGNOSIS — I48.0: ICD-10-CM

## 2018-05-07 DIAGNOSIS — I49.9: Primary | ICD-10-CM

## 2018-05-07 DIAGNOSIS — Z85.3: ICD-10-CM

## 2018-05-07 DIAGNOSIS — I25.10: ICD-10-CM

## 2018-05-07 DIAGNOSIS — Z80.41: ICD-10-CM

## 2018-05-07 DIAGNOSIS — Z80.49: ICD-10-CM

## 2018-05-07 DIAGNOSIS — E87.6: ICD-10-CM

## 2018-05-07 DIAGNOSIS — Z95.810: ICD-10-CM

## 2018-05-07 DIAGNOSIS — Z87.01: ICD-10-CM

## 2018-05-07 DIAGNOSIS — Z82.49: ICD-10-CM

## 2018-05-07 DIAGNOSIS — R77.8: ICD-10-CM

## 2018-05-07 LAB
APTT BLD: 22.3 SEC (ref 22–30)
BASOPHILS # BLD AUTO: 0.1 K/UL (ref 0–0.2)
BASOPHILS NFR BLD AUTO: 1 %
EOSINOPHIL # BLD AUTO: 0.3 K/UL (ref 0–0.7)
EOSINOPHIL NFR BLD AUTO: 3 %
ERYTHROCYTE [DISTWIDTH] IN BLOOD BY AUTOMATED COUNT: 3.56 M/UL (ref 3.8–5.4)
ERYTHROCYTE [DISTWIDTH] IN BLOOD: 12.7 % (ref 11.5–15.5)
HCT VFR BLD AUTO: 33.1 % (ref 34–46)
HGB BLD-MCNC: 11.1 GM/DL (ref 11.4–16)
INR PPP: 1 (ref ?–1.2)
LYMPHOCYTES # SPEC AUTO: 2.5 K/UL (ref 1–4.8)
LYMPHOCYTES NFR SPEC AUTO: 25 %
MCH RBC QN AUTO: 31 PG (ref 25–35)
MCHC RBC AUTO-ENTMCNC: 33.5 G/DL (ref 31–37)
MCV RBC AUTO: 92.8 FL (ref 80–100)
MONOCYTES # BLD AUTO: 0.4 K/UL (ref 0–1)
MONOCYTES NFR BLD AUTO: 4 %
NEUTROPHILS # BLD AUTO: 6.6 K/UL (ref 1.3–7.7)
NEUTROPHILS NFR BLD AUTO: 66 %
PLATELET # BLD AUTO: 231 K/UL (ref 150–450)
PT BLD: 10 SEC (ref 9–12)
WBC # BLD AUTO: 10 K/UL (ref 3.8–10.6)

## 2018-05-07 PROCEDURE — 85610 PROTHROMBIN TIME: CPT

## 2018-05-07 PROCEDURE — 83735 ASSAY OF MAGNESIUM: CPT

## 2018-05-07 PROCEDURE — 85730 THROMBOPLASTIN TIME PARTIAL: CPT

## 2018-05-07 PROCEDURE — 80053 COMPREHEN METABOLIC PANEL: CPT

## 2018-05-07 PROCEDURE — 84132 ASSAY OF SERUM POTASSIUM: CPT

## 2018-05-07 PROCEDURE — 85025 COMPLETE CBC W/AUTO DIFF WBC: CPT

## 2018-05-07 PROCEDURE — 81001 URINALYSIS AUTO W/SCOPE: CPT

## 2018-05-07 PROCEDURE — 84484 ASSAY OF TROPONIN QUANT: CPT

## 2018-05-07 PROCEDURE — 93306 TTE W/DOPPLER COMPLETE: CPT

## 2018-05-07 RX ADMIN — SODIUM CHLORIDE SCH MLS/HR: 450 INJECTION, SOLUTION INTRAVENOUS at 23:24

## 2018-05-07 RX ADMIN — CARVEDILOL SCH MG: 12.5 TABLET, FILM COATED ORAL at 22:30

## 2018-05-08 LAB
ALBUMIN SERPL-MCNC: 2.9 G/DL (ref 3.5–5)
ALP SERPL-CCNC: 62 U/L (ref 38–126)
ALT SERPL-CCNC: 20 U/L (ref 9–52)
ANION GAP SERPL CALC-SCNC: 12 MMOL/L
AST SERPL-CCNC: 25 U/L (ref 14–36)
BASOPHILS # BLD AUTO: 0 K/UL (ref 0–0.2)
BASOPHILS NFR BLD AUTO: 0 %
BUN SERPL-SCNC: 29 MG/DL (ref 7–17)
CALCIUM SPEC-MCNC: 9 MG/DL (ref 8.4–10.2)
CHLORIDE SERPL-SCNC: 108 MMOL/L (ref 98–107)
CO2 SERPL-SCNC: 24 MMOL/L (ref 22–30)
EOSINOPHIL # BLD AUTO: 0.3 K/UL (ref 0–0.7)
EOSINOPHIL NFR BLD AUTO: 4 %
ERYTHROCYTE [DISTWIDTH] IN BLOOD BY AUTOMATED COUNT: 3.38 M/UL (ref 3.8–5.4)
ERYTHROCYTE [DISTWIDTH] IN BLOOD: 13 % (ref 11.5–15.5)
GLUCOSE BLD-MCNC: 128 MG/DL (ref 75–99)
GLUCOSE BLD-MCNC: 130 MG/DL (ref 75–99)
GLUCOSE BLD-MCNC: 137 MG/DL (ref 75–99)
GLUCOSE BLD-MCNC: 139 MG/DL (ref 75–99)
GLUCOSE SERPL-MCNC: 113 MG/DL (ref 74–99)
HCT VFR BLD AUTO: 32.3 % (ref 34–46)
HGB BLD-MCNC: 10.7 GM/DL (ref 11.4–16)
LYMPHOCYTES # SPEC AUTO: 2.3 K/UL (ref 1–4.8)
LYMPHOCYTES NFR SPEC AUTO: 31 %
MAGNESIUM SPEC-SCNC: 1.9 MG/DL (ref 1.6–2.3)
MAGNESIUM SPEC-SCNC: 2 MG/DL (ref 1.6–2.3)
MCH RBC QN AUTO: 31.6 PG (ref 25–35)
MCHC RBC AUTO-ENTMCNC: 33.1 G/DL (ref 31–37)
MCV RBC AUTO: 95.6 FL (ref 80–100)
MONOCYTES # BLD AUTO: 0.4 K/UL (ref 0–1)
MONOCYTES NFR BLD AUTO: 5 %
NEUTROPHILS # BLD AUTO: 4.5 K/UL (ref 1.3–7.7)
NEUTROPHILS NFR BLD AUTO: 59 %
PH UR: 5 [PH] (ref 5–8)
PLATELET # BLD AUTO: 202 K/UL (ref 150–450)
POTASSIUM SERPL-SCNC: 3.1 MMOL/L (ref 3.5–5.1)
POTASSIUM SERPL-SCNC: 3.9 MMOL/L (ref 3.5–5.1)
PROT SERPL-MCNC: 5.3 G/DL (ref 6.3–8.2)
SODIUM SERPL-SCNC: 144 MMOL/L (ref 137–145)
SP GR UR: 1.01 (ref 1–1.03)
SQUAMOUS UR QL AUTO: <1 /HPF (ref 0–4)
UROBILINOGEN UR QL STRIP: <2 MG/DL (ref ?–2)
WBC # BLD AUTO: 7.6 K/UL (ref 3.8–10.6)
WBC #/AREA URNS HPF: 8 /HPF (ref 0–5)

## 2018-05-08 RX ADMIN — POTASSIUM CHLORIDE SCH MEQ: 20 TABLET, EXTENDED RELEASE ORAL at 14:08

## 2018-05-08 RX ADMIN — SULFAMETHOXAZOLE AND TRIMETHOPRIM SCH EACH: 800; 160 TABLET ORAL at 21:58

## 2018-05-08 RX ADMIN — CARVEDILOL SCH MG: 12.5 TABLET, FILM COATED ORAL at 06:35

## 2018-05-08 RX ADMIN — ASPIRIN 81 MG CHEWABLE TABLET SCH MG: 81 TABLET CHEWABLE at 12:12

## 2018-05-08 RX ADMIN — ATORVASTATIN CALCIUM SCH MG: 40 TABLET, FILM COATED ORAL at 21:35

## 2018-05-08 RX ADMIN — FUROSEMIDE SCH MG: 40 TABLET ORAL at 06:11

## 2018-05-08 RX ADMIN — PANTOPRAZOLE SODIUM SCH MG: 40 TABLET, DELAYED RELEASE ORAL at 06:35

## 2018-05-08 RX ADMIN — LISINOPRIL SCH MG: 5 TABLET ORAL at 21:35

## 2018-05-08 RX ADMIN — SODIUM CHLORIDE SCH: 450 INJECTION, SOLUTION INTRAVENOUS at 21:00

## 2018-05-08 RX ADMIN — APIXABAN SCH MG: 5 TABLET, FILM COATED ORAL at 12:16

## 2018-05-08 RX ADMIN — LISINOPRIL SCH MG: 5 TABLET ORAL at 12:12

## 2018-05-08 RX ADMIN — APIXABAN SCH MG: 5 TABLET, FILM COATED ORAL at 21:35

## 2018-05-08 RX ADMIN — POTASSIUM CHLORIDE SCH MEQ: 20 TABLET, EXTENDED RELEASE ORAL at 12:16

## 2018-05-08 RX ADMIN — FUROSEMIDE SCH MG: 40 TABLET ORAL at 12:12

## 2018-05-08 RX ADMIN — CARVEDILOL SCH MG: 12.5 TABLET, FILM COATED ORAL at 16:03

## 2018-05-08 NOTE — HP
HISTORY AND PHYSICAL



CHIEF COMPLAINTS:

Dizziness and probably AICD shock.



HISTORY OF PRESENT ILLNESS:

This 72-year-old woman with a past history of atrial fibrillation, CVA, TIA, history of

atrial fibrillation, history of pacemaker and ESBL E coli, being followed by no primary

physician in the outpatient setting, was apparently walking today. The patient felt a

shock and subsequently numbness and tingling and the patient almost fainted.  The

patient apparently was being followed by Dr. Cade previously, but not being compliant

with the medication because apparently the patient was not able to get a ride to the

pharmacy according to her.  The patient has not seen a primary physician either.  There

is no history of fever, rigors, chills at this time.



PAST MEDICAL HISTORY:

History of hypertension, history of breast cancer, history of GERD, hyperlipidemia,

atrial fibrillation.



MEDICATIONS:

Prior to admission, which the patient is not compliant:

1. Lisinopril 20 mg p.o. b.i.d.

2. Lasix 40 mg p.o. b.i.d.

3. Diurex 1 tab p.o. daily.

4. Eliquis 5 mg p.o. daily.



ALLERGIES:

AMLODIPINE, CARDIZEM, HYDROCHLOROTHIAZIDE, XARELTO, DYAZIDE.



FAMILY HISTORY:

History of myocardial infarction, colon cancer in the family.



SOCIAL HISTORY:

No history of smoking.  No history of alcohol intake.



REVIEW OF SYSTEMS:

ENT:  No diminished hearing or vision.

CARDIOVASCULAR: As mentioned.

RESPIRATORY: As mentioned.

GI: No nausea.

: No dysuria.

NERVOUS SYSTEM: No numbness, weakness.

ALLERGY: No asthma.

MUSCULOSKELETAL: As mentioned.

HEMATOLOGY: No history of anemia.

ENDOCRINE: As mentioned earlier.

CONSTITUTIONAL:  As mentioned.

DERMATOLOGY: Negative.

RHEUMATOLOGY: Negative.

PSYCHIATRY: As mentioned earlier.



PHYSICAL EXAMINATION:

Alert, oriented x3.  The pulse is 77, blood pressure 119/80, respiration 18,

temperature 97.4, pulse ox 98% room air.

HEENT: Conjunctivae normal.

NECK: No jugular venous distention.

CARDIOVASCULAR: S1, S2.

RESPIRATORY: Breath sounds diminished in the bases. A few scattered rhonchi and

crackles.

ABDOMEN:  Soft, nontender.  No mass palpable.

LEGS: No edema, no swelling.

NERVOUS SYSTEM: Higher functions as mentioned earlier. Moves all 4 limbs.  No focal

motor deficits.

LYMPHATIC: No lymphadenopathy in the neck, axillae, groin.

SKIN: No ulcer, rash, bleeding.

JOINTS: No active deforming arthropathy.



LAB INVESTIGATIONS:

Lab investigations available at this time show WBC 10, hemoglobin 11.1, and troponin

0.122.



ASSESSMENT:

1. Chest pain, dizziness, rule out acute non ST elevation myocardial infarction with

    troponin 0.122.

2. rule out cardiac arrhythmia.

3. Noncompliance.

4. Atrial fibrillation.

5. Cerebrovascular accident, transient ischemic attack.

6. History of congestive heart failure.

7. Gastroesophageal reflux disease.

8. Hypertension.

9. History of pneumonia.

10.History atrial fibrillation with fast ventricular rate.

11.History ESBL E coli.



RECOMMENDATIONS AND DISCUSSION:

I recommend to continue current management and symptomatic treatment.  Otherwise at

this time, beta blockers and ACE inhibitors, cardiology consultation, rule myocardial

infarction. Prognosis guarded because of multiple complex medical issues.  Importance

of compliance was stressed to the patient.  Further recommendations to follow.





MMODL / IJN: [id number] / Job#: [id number]

## 2018-05-08 NOTE — P.CRDCN
History of Present Illness


Consult date: 05/08/18


Requesting physician: Anatoly Ruggiero


Consult reason: chest pain


Chief complaint: Chest pain


History of present illness: 


This is a 72-year-old  female who follows with Dr. Mcdonald in the 

office.  She has known history of hypertension, paroxysmal  persistent atrial 

fibrillation, nonischemic cardiomyopathy, renal insufficiency, coronary artery 

disease, patient underwent a cardiac catheterization in December 2015 which 

revealed moderate disease in the proximal circumflex and mild nonobstructive 

disease in the right coronary artery, medical therapy was advised at that time.

  Patient also has prior history of TIA, history also of not taking her 

medications at times.  Patient was transferred here from [facility].

  According to the patient, overall she's been feeling well, she decided 

yesterday to take a walk to the post office, on her walk back from the post 

office she became extremely dizzy, she then states she walked over to a lamp 

pole to hang on because she thought she may pass out.  She did lower herself 

down to the ground, during this time patient experienced a sudden severe chest 

pain across her chest which she states felt like electricity.  She did not pass 

out completely.  She called out for help, a lady sitting on her a balcony came 

to help her and EMS was called.  Blood pressure on EMS arrival 185/87, heart 

rate in the 70s, 97% on room air.  At the time of their arrival, patient was 

complaining of lightheadedness and dizziness, EKG on arrival to [facility] showed a ventricular paced rhythm with underlying atrial fibrillation.

  Patient's home medications include Eliquis 5 mg daily which she should be 

taking twice a day, Lasix 40 mg twice a day, lisinopril 20 mg twice a day, 

magnesium.  Laboratory data revealed pH reviewed white blood cell count 11.5, 

hemoglobin 13.3, platelet count 263.  Sodium 142, potassium 3.5, BUN 21, 

creatinine 1.2.  Troponin 0.03.  .  Patient does state the day prior to 

this episode she had some discomfort in her left arm, but otherwise has been 

doing quite well at home overall.  She also stated that on her last office 

visit with Dr. Mcdonald she was unhappy with him because he commented on her being 

noncompliant with her medications.  Appears by her home list at this time, that 

is not consistent with the medications in the office.








Past Medical History


Past Medical History: Atrial Fibrillation, Cancer, Heart Failure, CVA/TIA, GERD/

Reflux, Hypertension, Pneumonia, Renal Disease


Additional Past Medical History / Comment(s): Afib with RVR in the past, left 

breast CA with surgery/chemo, TIA, right lower lobe pneumonia, cataracts 

bilaterally, anemia, tinnitis bilaterally, numbness/tingling at times bilateral 

hands, hemorrhoids.  states she had kidney problems last admission and did not 

need dialysis


History of Any Multi-Drug Resistant Organisms: ESBL


Date of last positivie culture/infection: 6/9/16 ESBL-E.coli


MDRO Source:: Urine


Past Surgical History: Heart Catheterization, Pacemaker


Additional Past Surgical History / Comment(s): 12/29/15  cardiac cath-treated 

medically, left mastectomy, tumor removed behind pt right ear, colonoscopy. 

pacemaker 8/2016


Past Anesthesia/Blood Transfusion Reactions: Motion Sickness


Additional Past Anesthesia/Blood Transfusion Reaction / Comment(s): Difficulty 

waking up after one surgery.


Type of Cardiac Device: Permanent Pacemaker


Device Placement Date:: 2016


Past Psychological History: No Psychological Hx Reported


Additional Psychological History / Comment(s): Pt resides alone.  She is 

independent.  She uses no assistive device.  She drives.


Smoking Status: Never smoker


Past Alcohol Use History: None Reported


Past Drug Use History: None Reported





- Past Family History


  ** Father


Family Medical History: Myocardial Infarction (MI)





  ** Mother


Family Medical History: Cancer


Additional Family Medical History / Comment(s): colon cancer





  ** Sister(s)


Family Medical History: Cancer, Thyroid Disorder


Additional Family Medical History / Comment(s): cervical cancer and ovarian 

cancer





Medications and Allergies


 Home Medications











 Medication  Instructions  Recorded  Confirmed  Type


 


Apixaban [Eliquis] 5 mg PO DAILY 06/05/17 05/07/18 History


 


Furosemide [Lasix] 40 mg PO BID@0600,1200 06/05/17 05/07/18 History


 


Lisinopril 20 mg PO BID@0600,1200 06/05/17 05/07/18 History


 


Diurex 1 tab PO DAILY 05/07/18 05/07/18 History











 Allergies











Allergy/AdvReac Type Severity Reaction Status Date / Time


 


amlodipine Allergy  Rash/Hives Verified 05/07/18 21:56


 


diltiazem HCl [From Cardizem] Allergy  Rash/Hives Verified 05/07/18 21:56


 


hydrochlorothiazide AdvReac  Vertigo Verified 05/07/18 21:56


 


rivaroxaban [From Xarelto] AdvReac  Vertigo Verified 05/07/18 21:56


 


triamterene [From Dyazide] AdvReac  Vertigo Verified 05/07/18 21:56














Physical Exam


Vitals: 


 Vital Signs











  Temp Pulse Resp BP Pulse Ox


 


 05/08/18 08:00  97.7 F  63  16  133/65  97


 


 05/08/18 04:00  97.6 F  67  18  132/60  99


 


 05/08/18 00:00  97.2 F L  71  18  125/59  94 L


 


 05/07/18 21:30   77  18  


 


 05/07/18 21:15  97.4 F L  77  18  194/88  96








 Intake and Output











 05/07/18 05/08/18 05/08/18





 22:59 06:59 14:59


 


Intake Total 300 118.103 


 


Output Total  250 


 


Balance 300 -131.897 


 


Intake:   


 


    


 


    0.9 300  


 


  Intake, IV Titration  118.103 





  Amount   


 


    Heparin Sodium,Porcine/  118.103 





    D5w Pmx 25,000 unit In   





    Dextrose/Water 0 500ml.   





    bag @ 12 UNITS/KG/HR 17.   





    54 mls/hr IV .Q24H [facility] Rx   





    #:[id number]   


 


Output:   


 


  Urine  250 


 


Other:   


 


  Voiding Method Toilet Toilet 


 


  Weight 73.1 kg 73.1 kg 











PHYSICAL EXAMINATION: 





HEENT: Head is atraumatic, normocephalic.  Pupils equal, round.  Neck is 

supple.  There is no elevated jugular venous pressure.





HEART EXAMINATION: Heart S1 and S2 irregularly irregular systolic murmur is 

heard.





CHEST EXAMINATION: Lungs are clear to auscultation and precussion. No chest 

wall tenderness is noted on palpation or with deep breathing.





ABDOMEN:  Soft, nontender. Bowel sounds are heard. No organomegaly noted.


 


EXTREMITIES: 2+ peripheral pulses with no evidence of peripheral edema and no 

calf tenderness noted.





NEUROLOGIC patient is awake, alert and oriented -3.


 


.


 











Results





 05/08/18 05:35





 05/08/18 05:35


 Cardiac Enzymes











  05/07/18 05/08/18 05/08/18 Range/Units





  22:35 05:35 05:35 


 


AST    25  (14-36)  U/L


 


Troponin I  0.122 H*  0.085 H*   (0.000-0.034)  ng/mL








 Coagulation











  05/07/18 05/08/18 Range/Units





  22:35 05:35 


 


PT  10.0   (9.0-12.0)  sec


 


APTT  22.3  42.6 H  (22.0-30.0)  sec








 CBC











  05/07/18 05/08/18 Range/Units





  22:35 05:35 


 


WBC  10.0  7.6  (3.8-10.6)  k/uL


 


RBC  3.56 L  3.38 L  (3.80-5.40)  m/uL


 


Hgb  11.1 L  10.7 L  (11.4-16.0)  gm/dL


 


Hct  33.1 L  32.3 L  (34.0-46.0)  %


 


Plt Count  231  202  (150-450)  k/uL








 Comprehensive Metabolic Panel











  05/08/18 Range/Units





  05:35 


 


Sodium  144  (137-145)  mmol/L


 


Potassium  3.1 L  (3.5-5.1)  mmol/L


 


Chloride  108 H  ()  mmol/L


 


Carbon Dioxide  24  (22-30)  mmol/L


 


BUN  29 H  (7-17)  mg/dL


 


Creatinine  1.00  (0.52-1.04)  mg/dL


 


Glucose  113 H  (74-99)  mg/dL


 


Calcium  9.0  (8.4-10.2)  mg/dL


 


AST  25  (14-36)  U/L


 


ALT  20  (9-52)  U/L


 


Alkaline Phosphatase  62  ()  U/L


 


Total Protein  5.3 L  (6.3-8.2)  g/dL


 


Albumin  2.9 L  (3.5-5.0)  g/dL








 Current Medications











Generic Name Dose Route Start Last Admin





  Trade Name Freq  PRN Reason Stop Dose Admin


 


Acetaminophen  650 mg  05/07/18 21:52  





  Tylenol Tab  PO   





  Q6HR PRN   





  Fever and/ or MILD Pain   


 


Alprazolam  0.25 mg  05/08/18 00:10  





  Xanax  PO   





  TID PRN   





  Anxiety   


 


Aspirin  81 mg  05/08/18 09:00  





  Aspirin  PO   





  DAILY [facility]   


 


Carvedilol  12.5 mg  05/07/18 22:00  05/08/18 06:35





  Coreg  PO   12.5 mg





  BID-W/MEALS [facility]   Administration


 


Furosemide  40 mg  05/08/18 06:00  05/08/18 06:11





  Lasix  PO   40 mg





  BID@0600,1200 [facility]   Administration


 


Heparin Sodium (Porcine)  0 unit  05/07/18 21:55  





  Heparin  IV   





  PER PROTOCOL PRN   





  Low PTT   





  Protocol   


 


Hydralazine HCl  50 mg  05/07/18 21:53  





  Apresoline  PO   





  QID PRN   





  BP >180/100   


 


Heparin Sodium/Dextrose 25,000  500 mls @ 17.54 mls/hr  05/07/18 23:15  05/08/ 18 06:08





  unit/ IV Solution  IV   14 units/kg/hr





  .Q24H [facility]   20.46 mls/hr





  Protocol   Titration





  12 UNITS/KG/HR   


 


Lisinopril  5 mg  05/08/18 09:00  





  Zestril  PO   





  BID [facility]   


 


Pantoprazole Sodium  40 mg  05/08/18 07:30  05/08/18 06:35





  Protonix  PO   40 mg





  AC-BRKFST MARITO   Administration


 


Temazepam  15 mg  05/08/18 00:10  





  Restoril  PO   





  HS PRN   





  Insomnia   








 Intake and Output











 05/07/18 05/08/18 05/08/18





 22:59 06:59 14:59


 


Intake Total 300 118.103 


 


Output Total  250 


 


Balance 300 -131.897 


 


Intake:   


 


    


 


    0.9 300  


 


  Intake, IV Titration  118.103 





  Amount   


 


    Heparin Sodium,Porcine/  118.103 





    D5w Pmx 25,000 unit In   





    Dextrose/Water 0 500ml.   





    bag @ 12 UNITS/KG/HR 17.   





    54 mls/hr IV .Q24H [facility] Rx   





    #:[id number]   


 


Output:   


 


  Urine  250 


 


Other:   


 


  Voiding Method Toilet Toilet 


 


  Weight 73.1 kg 73.1 kg 








 





 05/08/18 05:35 





 05/08/18 05:35 











EKG Interpretations (text)





EKG [facility] showed a ventricular paced rhythm with underlying 

atrial fibrillation.  EKG performed on arrival shows a sensed V paced rhythm 

with underlying normal sinus rhythm.





Assessment and Plan


Plan: 


Assessment and plan


#1 dizziness with associated sudden onset of severe pain in the chest.  

Possible AICD discharge.  Troponins 0.03, 0.122, 0.085.  EKG shows a 

ventricular paced rhythm with underlying atrial fibrillation.  Patient's last 

stress test was performed in 2016, Ana scan which revealed a small distal 

inferior scar.  She does have history of coronary artery disease with prior 

cardiac catheterization in 2015 which revealed a 60% lesion in the circumflex.


#2 nonischemic cardiomyopathy with prior AICD implantation


#3 hypertension


#4 hyperlipidemia


#5 paroxysmal atrial fibrillation on Eliquis once daily for anticoagulation.


#6 hypokalemia


#7 abnormal troponins, could represent acute coronary syndrome.  Patient has 

known 60% circumflex lesion, she did complain of some left arm discomfort the 

day prior to this episode.








Plan


We will replace the patient's potassium.  Obtain echocardiogram with Doppler 

study.  We'll also interrogate the patient's AICD to see if she had a discharge 

from that.  We will start the patient on a statin.  We'll continue aspirin 81 

mg daily, Coreg 12-1/2 mg twice a day, Lasix 40 twice a day, lisinopril 5 mg 

twice a day.  We will reinitiate the patient's Eliquis, administering it as a 

twice a day dose.  Further recommendations to follow.





DNP note has been reviewed, I agree with a documented findings and plan of 

care.  Patient was seen and examined.

## 2018-05-08 NOTE — PN
PROGRESS NOTE



DATE OF SERVICE:

05/08/2018



This 72-year-old woman who was admitted with chest pain and dizziness also had minimal

troponin elevation. AICD interrogation showed possible atrial fibrillation.  No chest

pain.  No palpitations.  No fever.



On examination, alert and oriented x3. Pulse is 68, blood pressure 160/74, respirations

16, temperature normal.  Pulse ox is 98% on room air.

HEENT: Conjunctivae normal.

NECK: No jugular venous distention.

CARDIOVASCULAR SYSTEM:  S1, S2 muffled.

RESPIRATORY SYSTEM: Breath sounds diminished at the bases.  No rhonchi. No crackles.

ABDOMEN: Soft, non-tender.

LEGS: No edema. No swelling.

NERVOUS SYSTEM: No focal deficit..



LABS: WBC 7.6, hemoglobin 10.7. Troponin 0.122. UA noted.



ASSESSMENT:

1. Chest pain, dizziness; rule out acute non-ST-segment-elevation myocardial

    infarction with troponin 0.122, indeterminate.

2. Rule out cardiac arrhythmia, atrial fibrillation.

3. History of noncompliance.

4. History of atrial fibrillation.

5. Cerebrovascular accident, transient ischemic attack.

6. History of congestive heart failure.

7. History of gastroesophageal reflux disease.

8. Hypertension.

9. History of pneumonia.

10.History of ESBL Escherichia coli.



RECOMMENDATIONS AND DISCUSSION:

In this 72-year-old woman who presented with multiple complex medical issues., we will

monitor the patient closely, continue the current medications, continue with

symptomatic treatment. Otherwise at this time I would also recommend close monitoring

with Cardiology.  Importance of compliance was stressed with the patient and family.

Further recommendations to follow.





MMODL / IJN: [id number] / Job#: [id number]

## 2018-05-09 VITALS — RESPIRATION RATE: 16 BRPM

## 2018-05-09 LAB
ALBUMIN SERPL-MCNC: 3.6 G/DL (ref 3.5–5)
ALP SERPL-CCNC: 73 U/L (ref 38–126)
ALT SERPL-CCNC: 19 U/L (ref 9–52)
ANION GAP SERPL CALC-SCNC: 14 MMOL/L
AST SERPL-CCNC: 18 U/L (ref 14–36)
BASOPHILS # BLD AUTO: 0 K/UL (ref 0–0.2)
BASOPHILS NFR BLD AUTO: 0 %
BUN SERPL-SCNC: 25 MG/DL (ref 7–17)
CALCIUM SPEC-MCNC: 9.5 MG/DL (ref 8.4–10.2)
CHLORIDE SERPL-SCNC: 111 MMOL/L (ref 98–107)
CO2 SERPL-SCNC: 21 MMOL/L (ref 22–30)
EOSINOPHIL # BLD AUTO: 0.4 K/UL (ref 0–0.7)
EOSINOPHIL NFR BLD AUTO: 4 %
ERYTHROCYTE [DISTWIDTH] IN BLOOD BY AUTOMATED COUNT: 3.61 M/UL (ref 3.8–5.4)
ERYTHROCYTE [DISTWIDTH] IN BLOOD: 13.1 % (ref 11.5–15.5)
GLUCOSE BLD-MCNC: 103 MG/DL (ref 75–99)
GLUCOSE BLD-MCNC: 107 MG/DL (ref 75–99)
GLUCOSE BLD-MCNC: 137 MG/DL (ref 75–99)
GLUCOSE SERPL-MCNC: 131 MG/DL (ref 74–99)
HCT VFR BLD AUTO: 34.8 % (ref 34–46)
HGB BLD-MCNC: 11.1 GM/DL (ref 11.4–16)
LYMPHOCYTES # SPEC AUTO: 1.5 K/UL (ref 1–4.8)
LYMPHOCYTES NFR SPEC AUTO: 15 %
MCH RBC QN AUTO: 30.6 PG (ref 25–35)
MCHC RBC AUTO-ENTMCNC: 31.8 G/DL (ref 31–37)
MCV RBC AUTO: 96.2 FL (ref 80–100)
MONOCYTES # BLD AUTO: 0.5 K/UL (ref 0–1)
MONOCYTES NFR BLD AUTO: 5 %
NEUTROPHILS # BLD AUTO: 7.7 K/UL (ref 1.3–7.7)
NEUTROPHILS NFR BLD AUTO: 75 %
PLATELET # BLD AUTO: 236 K/UL (ref 150–450)
POTASSIUM SERPL-SCNC: 4.2 MMOL/L (ref 3.5–5.1)
PROT SERPL-MCNC: 6.2 G/DL (ref 6.3–8.2)
SODIUM SERPL-SCNC: 146 MMOL/L (ref 137–145)
WBC # BLD AUTO: 10.2 K/UL (ref 3.8–10.6)

## 2018-05-09 RX ADMIN — ASPIRIN 81 MG CHEWABLE TABLET SCH MG: 81 TABLET CHEWABLE at 08:34

## 2018-05-09 RX ADMIN — SODIUM CHLORIDE SCH: 450 INJECTION, SOLUTION INTRAVENOUS at 19:36

## 2018-05-09 RX ADMIN — SULFAMETHOXAZOLE AND TRIMETHOPRIM SCH EACH: 800; 160 TABLET ORAL at 19:32

## 2018-05-09 RX ADMIN — CARVEDILOL SCH MG: 12.5 TABLET, FILM COATED ORAL at 06:22

## 2018-05-09 RX ADMIN — FUROSEMIDE SCH MG: 40 TABLET ORAL at 06:22

## 2018-05-09 RX ADMIN — SULFAMETHOXAZOLE AND TRIMETHOPRIM SCH EACH: 800; 160 TABLET ORAL at 08:34

## 2018-05-09 RX ADMIN — CARVEDILOL SCH MG: 12.5 TABLET, FILM COATED ORAL at 17:18

## 2018-05-09 RX ADMIN — LISINOPRIL SCH MG: 5 TABLET ORAL at 19:33

## 2018-05-09 RX ADMIN — APIXABAN SCH MG: 5 TABLET, FILM COATED ORAL at 08:34

## 2018-05-09 RX ADMIN — FUROSEMIDE SCH MG: 40 TABLET ORAL at 11:19

## 2018-05-09 RX ADMIN — APIXABAN SCH MG: 5 TABLET, FILM COATED ORAL at 19:32

## 2018-05-09 RX ADMIN — LISINOPRIL SCH MG: 5 TABLET ORAL at 08:34

## 2018-05-09 RX ADMIN — PANTOPRAZOLE SODIUM SCH MG: 40 TABLET, DELAYED RELEASE ORAL at 06:21

## 2018-05-09 RX ADMIN — ATORVASTATIN CALCIUM SCH MG: 40 TABLET, FILM COATED ORAL at 19:33

## 2018-05-09 NOTE — P.PN
Subjective


Progress Note Date: 05/09/18





This is a 72-year-old  female who follows with Dr. Mcdonald in the 

office.  She has known history of hypertension, paroxysmal  persistent atrial 

fibrillation, nonischemic cardiomyopathy, renal insufficiency, coronary artery 

disease, patient underwent a cardiac catheterization in December 2015 which 

revealed moderate disease in the proximal circumflex and mild nonobstructive 

disease in the right coronary artery, medical therapy was advised at that time.

  Patient also has prior history of TIA, history also of not taking her 

medications at times.  Patient was transferred here from [facility].

  According to the patient, overall she's been feeling well, she decided 

yesterday to take a walk to the post office, on her walk back from the post 

office she became extremely dizzy, she then states she walked over to a lamp 

pole to hang on because she thought she may pass out.  She did lower herself 

down to the ground, during this time patient experienced a sudden severe chest 

pain across her chest which she states felt like electricity.  She did not pass 

out completely.  She called out for help, a lady sitting on her a balcony came 

to help her and EMS was called.  Blood pressure on EMS arrival 185/87, heart 

rate in the 70s, 97% on room air.  At the time of their arrival, patient was 

complaining of lightheadedness and dizziness, EKG on arrival to [facility] showed a ventricular paced rhythm with underlying atrial fibrillation.

  Patient's home medications include Eliquis 5 mg daily which she should be 

taking twice a day, Lasix 40 mg twice a day, lisinopril 20 mg twice a day, 

magnesium.  Laboratory data revealed pH reviewed white blood cell count 11.5, 

hemoglobin 13.3, platelet count 263.  Sodium 142, potassium 3.5, BUN 21, 

creatinine 1.2.  Troponin 0.03.  .  Patient does state the day prior to 

this episode she had some discomfort in her left arm, but otherwise has been 

doing quite well at home overall.  She also stated that on her last office 

visit with Dr. Mcdonald she was unhappy with him because he commented on her being 

noncompliant with her medications.  Appears by her home list at this time, that 

is not consistent with the medications in the office.











05/09/2018


Patient had her device interrogated yesterday, she did have an AICD discharge 

for rapid atrial fib.  Patient was advised strongly the importance of taking 

all of her medications on a regular basis.  Throughout the night last night, 

patient had one brief episode of what appears to be atrial fibrillation or 

atrial tachycardia.  She's been up ambulating in the hallway today, no symptoms 

of dizziness or lightheadedness, no chest discomfort or difficulty in breathing.


Blood pressure 146/60 with a heart rate in the 70s.





Objective





- Vital Signs


Vital signs: 


 Vital Signs











Temp  97.6 F   05/09/18 03:47


 


Pulse  66   05/09/18 11:26


 


Resp  16   05/09/18 11:26


 


BP  187/86   05/09/18 11:23


 


Pulse Ox  97   05/09/18 11:23








 Intake & Output











 05/08/18 05/09/18 05/09/18





 18:59 06:59 18:59


 


Intake Total 622 10 598


 


Output Total 300 200 


 


Balance 322 -190 598


 


Weight  74 kg 


 


Intake:   


 


  IV 80 10 


 


    0.9 80 10 


 


  Intake, IV Titration 80  





  Amount   


 


    Heparin Sodium,Porcine/ 80  





    D5w Pmx 500 ml @ 17.5 mls   





    /hr IV .Q24H [facility] Rx#:   





    [id number]   


 


  Oral 462  598


 


Output:   


 


  Urine 300 200 


 


Other:   


 


  Voiding Method Toilet Toilet Toilet


 


  # Voids   1














- Exam





PHYSICAL EXAMINATION: 





HEENT: Head is atraumatic, normocephalic.  Pupils equal, round.  Neck is 

supple.  There is no elevated jugular venous pressure.





HEART EXAMINATION: Heart S1 and S2 irregularly irregular systolic murmur is 

heard.





CHEST EXAMINATION: Lungs are clear to auscultation and precussion. No chest 

wall tenderness is noted on palpation or with deep breathing.





ABDOMEN:  Soft, nontender. Bowel sounds are heard. No organomegaly noted.


 


EXTREMITIES: 2+ peripheral pulses with no evidence of peripheral edema and no 

calf tenderness noted.





NEUROLOGIC patient is awake, alert and oriented -3.


 


.


 





- Labs


CBC & Chem 7: 


 05/09/18 05:51





 05/09/18 05:51


Labs: 


 Abnormal Lab Results - Last 24 Hours (Table)











  05/08/18 05/08/18 05/09/18 Range/Units





  16:46 20:51 05:51 


 


RBC    3.61 L  (3.80-5.40)  m/uL


 


Hgb    11.1 L  (11.4-16.0)  gm/dL


 


Sodium     (137-145)  mmol/L


 


Chloride     ()  mmol/L


 


Carbon Dioxide     (22-30)  mmol/L


 


BUN     (7-17)  mg/dL


 


Creatinine     (0.52-1.04)  mg/dL


 


Glucose     (74-99)  mg/dL


 


POC Glucose (mg/dL)  137 H  139 H   (75-99)  mg/dL


 


Total Protein     (6.3-8.2)  g/dL














  05/09/18 05/09/18 05/09/18 Range/Units





  05:51 06:04 11:45 


 


RBC     (3.80-5.40)  m/uL


 


Hgb     (11.4-16.0)  gm/dL


 


Sodium  146 H    (137-145)  mmol/L


 


Chloride  111 H    ()  mmol/L


 


Carbon Dioxide  21 L    (22-30)  mmol/L


 


BUN  25 H    (7-17)  mg/dL


 


Creatinine  1.06 H    (0.52-1.04)  mg/dL


 


Glucose  131 H    (74-99)  mg/dL


 


POC Glucose (mg/dL)   137 H  103 H  (75-99)  mg/dL


 


Total Protein  6.2 L    (6.3-8.2)  g/dL














Assessment and Plan


Plan: 


Assessment and plan


#1 dizziness with associated sudden onset of severe pain in the chest.  

Possible AICD discharge.  Troponins 0.03, 0.122, 0.085.  EKG shows a 

ventricular paced rhythm with underlying atrial fibrillation.  Patient's last 

stress test was performed in 2016, Ana scan which revealed a small distal 

inferior scar.  She does have history of coronary artery disease with prior 

cardiac catheterization in 2015 which revealed a 60% lesion in the circumflex.


#2 nonischemic cardiomyopathy with prior AICD implantation


#3 hypertension


#4 hyperlipidemia


#5 paroxysmal atrial fibrillation on Eliquis once daily for anticoagulation.


#6 hypokalemia


#7 abnormal troponins, could represent acute coronary syndrome.  Patient has 

known 60% circumflex lesion, she did complain of some left arm discomfort the 

day prior to this episode.








Plan


Echo cardiogram with Doppler study revealed an ejection fraction of 35-40%.  

Device was interrogated yesterday, did show AICD discharge for rapid atrial 

fibrillation.  Patient's potassium was replaced, she was educated strongly 

regarding the importance of taking her medications on a regular basis.  We will 

ambulate her in the hallway today, continue to monitor and plan for possible 

discharge in the morning if stable.


DNP note has been reviewed, I agree with a documented findings and plan of 

care.  Patient was seen and examined.

## 2018-05-09 NOTE — PN
PROGRESS NOTE



DATE OF SERVICE:

05/09/2018



This 72-year-old woman was admitted with chest pain and presyncope, had possibly atrial

fibrillation in the AICD tracking.  No chest pain.  No palpitations.  Cardiology

following the patient closely. No fever. Patient also had history of significant

noncompliance also.



EXAM:

Alert and oriented x3. Pulse 66, blood pressure 197/87, respirations 16, temp normal,

pulse ox 97% on room air.

HEENT: Conjunctivae normal.

NECK: No jugular venous distention.

CARDIOVASCULAR: S1, S2.

RESPIRATORY:  Breath sounds diminished in the bases. No rhonchi, no crackles.

ABDOMEN: Soft, nontender.

LEGS: No edema.

NERVOUS SYSTEM: No focal deficits.



LAB STUDIES:

WBC 11.1, hemoglobin 10 sodium 146.  Other labs are noted.



ASSESSMENT:

1. Chest pain, dizziness, possibly cardiac arrhythmia.

2. Atrial fibrillation in AICD interrogation.

3. Troponin 0.122 indeterminate.

4. History of noncompliance.

5. History atrial fibrillation.

6. History of cerebrovascular accident, transient ischemic attack.

7. History of congestive heart failure.

8. Gastroesophageal reflux disease.

9. Hypertension.

10.History of pneumonia.

11.History ESBL E coli in the past.



RECOMMENDATIONS AND DISCUSSION:

I recommend to continue current medications, continue symptomatic treatment.  Otherwise

at this time I recommend monitor closely with Cardiology.  Increase ambulation.

Guarded prognosis. Further recommendations to follow.





MMROSIEL / TON: [id number] / Job#: [id number]

## 2018-05-10 VITALS — SYSTOLIC BLOOD PRESSURE: 196 MMHG | TEMPERATURE: 97.8 F | DIASTOLIC BLOOD PRESSURE: 74 MMHG | HEART RATE: 73 BPM

## 2018-05-10 LAB
ALBUMIN SERPL-MCNC: 3.2 G/DL (ref 3.5–5)
ALP SERPL-CCNC: 63 U/L (ref 38–126)
ALT SERPL-CCNC: 21 U/L (ref 9–52)
ANION GAP SERPL CALC-SCNC: 12 MMOL/L
AST SERPL-CCNC: 16 U/L (ref 14–36)
BASOPHILS # BLD AUTO: 0 K/UL (ref 0–0.2)
BASOPHILS NFR BLD AUTO: 0 %
BUN SERPL-SCNC: 21 MG/DL (ref 7–17)
CALCIUM SPEC-MCNC: 9 MG/DL (ref 8.4–10.2)
CHLORIDE SERPL-SCNC: 108 MMOL/L (ref 98–107)
CO2 SERPL-SCNC: 22 MMOL/L (ref 22–30)
EOSINOPHIL # BLD AUTO: 0.3 K/UL (ref 0–0.7)
EOSINOPHIL NFR BLD AUTO: 4 %
ERYTHROCYTE [DISTWIDTH] IN BLOOD BY AUTOMATED COUNT: 3.46 M/UL (ref 3.8–5.4)
ERYTHROCYTE [DISTWIDTH] IN BLOOD: 13 % (ref 11.5–15.5)
GLUCOSE SERPL-MCNC: 107 MG/DL (ref 74–99)
HCT VFR BLD AUTO: 32.9 % (ref 34–46)
HGB BLD-MCNC: 10.8 GM/DL (ref 11.4–16)
LYMPHOCYTES # SPEC AUTO: 0.6 K/UL (ref 1–4.8)
LYMPHOCYTES NFR SPEC AUTO: 7 %
MCH RBC QN AUTO: 31.2 PG (ref 25–35)
MCHC RBC AUTO-ENTMCNC: 32.8 G/DL (ref 31–37)
MCV RBC AUTO: 95.2 FL (ref 80–100)
MONOCYTES # BLD AUTO: 0.4 K/UL (ref 0–1)
MONOCYTES NFR BLD AUTO: 5 %
NEUTROPHILS # BLD AUTO: 6.5 K/UL (ref 1.3–7.7)
NEUTROPHILS NFR BLD AUTO: 83 %
PLATELET # BLD AUTO: 188 K/UL (ref 150–450)
POTASSIUM SERPL-SCNC: 3.7 MMOL/L (ref 3.5–5.1)
PROT SERPL-MCNC: 5.7 G/DL (ref 6.3–8.2)
SODIUM SERPL-SCNC: 142 MMOL/L (ref 137–145)
WBC # BLD AUTO: 7.8 K/UL (ref 3.8–10.6)

## 2018-05-10 RX ADMIN — SULFAMETHOXAZOLE AND TRIMETHOPRIM SCH EACH: 800; 160 TABLET ORAL at 08:13

## 2018-05-10 RX ADMIN — CARVEDILOL SCH MG: 12.5 TABLET, FILM COATED ORAL at 06:23

## 2018-05-10 RX ADMIN — FUROSEMIDE SCH MG: 40 TABLET ORAL at 06:23

## 2018-05-10 RX ADMIN — ASPIRIN 81 MG CHEWABLE TABLET SCH MG: 81 TABLET CHEWABLE at 08:13

## 2018-05-10 RX ADMIN — PANTOPRAZOLE SODIUM SCH MG: 40 TABLET, DELAYED RELEASE ORAL at 06:23

## 2018-05-10 RX ADMIN — APIXABAN SCH MG: 5 TABLET, FILM COATED ORAL at 08:13

## 2018-05-10 RX ADMIN — LISINOPRIL SCH MG: 5 TABLET ORAL at 08:13

## 2018-05-10 RX ADMIN — FUROSEMIDE SCH: 40 TABLET ORAL at 13:39

## 2018-05-10 NOTE — P.PN
Subjective


Progress Note Date: 05/10/18





This is a 72-year-old  female who follows with Dr. Mcdonald in the 

office.  She has known history of hypertension, paroxysmal  persistent atrial 

fibrillation, nonischemic cardiomyopathy, renal insufficiency, coronary artery 

disease, patient underwent a cardiac catheterization in December 2015 which 

revealed moderate disease in the proximal circumflex and mild nonobstructive 

disease in the right coronary artery, medical therapy was advised at that time.

  Patient also has prior history of TIA, history also of not taking her 

medications at times.  Patient was transferred here from [facility].

  According to the patient, overall she's been feeling well, she decided 

yesterday to take a walk to the post office, on her walk back from the post 

office she became extremely dizzy, she then states she walked over to a lamp 

pole to hang on because she thought she may pass out.  She did lower herself 

down to the ground, during this time patient experienced a sudden severe chest 

pain across her chest which she states felt like electricity.  She did not pass 

out completely.  She called out for help, a lady sitting on her a balcony came 

to help her and EMS was called.  Blood pressure on EMS arrival 185/87, heart 

rate in the 70s, 97% on room air.  At the time of their arrival, patient was 

complaining of lightheadedness and dizziness, EKG on arrival to [facility] showed a ventricular paced rhythm with underlying atrial fibrillation.

  Patient's home medications include Eliquis 5 mg daily which she should be 

taking twice a day, Lasix 40 mg twice a day, lisinopril 20 mg twice a day, 

magnesium.  Laboratory data revealed pH reviewed white blood cell count 11.5, 

hemoglobin 13.3, platelet count 263.  Sodium 142, potassium 3.5, BUN 21, 

creatinine 1.2.  Troponin 0.03.  .  Patient does state the day prior to 

this episode she had some discomfort in her left arm, but otherwise has been 

doing quite well at home overall.  She also stated that on her last office 

visit with Dr. Mcdonald she was unhappy with him because he commented on her being 

noncompliant with her medications.  Appears by her home list at this time, that 

is not consistent with the medications in the office.











05/09/2018


Patient had her device interrogated yesterday, she did have an AICD discharge 

for rapid atrial fib.  Patient was advised strongly the importance of taking 

all of her medications on a regular basis.  Throughout the night last night, 

patient had one brief episode of what appears to be atrial fibrillation or 

atrial tachycardia.  She's been up ambulating in the hallway today, no symptoms 

of dizziness or lightheadedness, no chest discomfort or difficulty in breathing.


Blood pressure 146/60 with a heart rate in the 70s.








05/10/2018





Patient seen and examined this morning, doing well overall.  Blood pressure 

remains elevated.  She's ALLERGIC to Norvasc, so we will increase her dose of 

ACE inhibitor to which she was taking at home previously.  We will also add 

Procardia XL 60 mg daily to her medication regime.  From cardiology's 

perspective, she may be able to be discharged home today, we will continue the 

Eliquis 5 mg one tablet by mouth twice a day.  Follow-up appointment in the 

office post discharge.





Objective





- Vital Signs


Vital signs: 


 Vital Signs











Temp  97.8 F   05/10/18 12:00


 


Pulse  73   05/10/18 12:00


 


Resp  16   05/10/18 12:00


 


BP  196/74   05/10/18 12:00


 


Pulse Ox  98   05/10/18 12:00








 Intake & Output











 05/09/18 05/10/18 05/10/18





 18:59 06:59 18:59


 


Intake Total 838  240


 


Output Total   200


 


Balance 838  40


 


Weight  73.3 kg 


 


Intake:   


 


  Oral 838  240


 


Output:   


 


  Urine   200


 


Other:   


 


  Voiding Method Toilet Toilet Toilet


 


  # Voids 1 3 1


 


  # Bowel Movements   0














- Exam





PHYSICAL EXAMINATION: 





HEENT: Head is atraumatic, normocephalic.  Pupils equal, round.  Neck is 

supple.  There is no elevated jugular venous pressure.





HEART EXAMINATION: Heart S1 and S2 irregularly irregular systolic murmur is 

heard.





CHEST EXAMINATION: Lungs are clear to auscultation and precussion. No chest 

wall tenderness is noted on palpation or with deep breathing.





ABDOMEN:  Soft, nontender. Bowel sounds are heard. No organomegaly noted.


 


EXTREMITIES: 2+ peripheral pulses with no evidence of peripheral edema and no 

calf tenderness noted.





NEUROLOGIC patient is awake, alert and oriented -3.


 


.


 





- Labs


CBC & Chem 7: 


 05/10/18 05:43





 05/10/18 05:43


Labs: 


 Abnormal Lab Results - Last 24 Hours (Table)











  05/09/18 05/10/18 05/10/18 Range/Units





  16:33 05:43 05:43 


 


RBC   3.46 L   (3.80-5.40)  m/uL


 


Hgb   10.8 L   (11.4-16.0)  gm/dL


 


Hct   32.9 L   (34.0-46.0)  %


 


Lymphocytes #   0.6 L   (1.0-4.8)  k/uL


 


Chloride    108 H  ()  mmol/L


 


BUN    21 H  (7-17)  mg/dL


 


Creatinine    1.20 H  (0.52-1.04)  mg/dL


 


Glucose    107 H  (74-99)  mg/dL


 


POC Glucose (mg/dL)  107 H    (75-99)  mg/dL


 


Total Protein    5.7 L  (6.3-8.2)  g/dL


 


Albumin    3.2 L  (3.5-5.0)  g/dL














Assessment and Plan


Plan: 


Assessment and plan


#1 dizziness with associated sudden onset of severe pain in the chest.  

Possible AICD discharge.  Troponins 0.03, 0.122, 0.085.  EKG shows a 

ventricular paced rhythm with underlying atrial fibrillation.  Patient's last 

stress test was performed in 2016, Ana scan which revealed a small distal 

inferior scar.  She does have history of coronary artery disease with prior 

cardiac catheterization in 2015 which revealed a 60% lesion in the circumflex.


#2 nonischemic cardiomyopathy with prior AICD implantation


#3 hypertension


#4 hyperlipidemia


#5 paroxysmal atrial fibrillation on Eliquis once daily for anticoagulation.


#6 hypokalemia


#7 abnormal troponins, could represent acute coronary syndrome.  Patient has 

known 60% circumflex lesion, she did complain of some left arm discomfort the 

day prior to this episode.








Plan


Echo cardiogram with Doppler study revealed an ejection fraction of 35-40%.  We 

will add Procardia XL 60 mg to the patient's medication regime, increase the 

dose of ACE inhibitor to what the patient was taking at home.  Patient will 

also continue on the Eliquis 5 mg one tablet by mouth twice a day.  She may be 

able to be discharged home from cardiology standpoint, we will follow her up in 

the office post discharge.








DNP note has been reviewed, I agree with a documented findings and plan of 

care.  Patient was seen and examined.

## 2018-05-10 NOTE — DS
DISCHARGE SUMMARY



DATE OF SERVICE:

05/10/2018



FINAL DIAGNOSES:

1. Chest pain, dizziness; possibly cardiac arrhythmia.

2. Atrial fibrillation on AICD interrogation.

3. Troponin 0.122, indeterminate.

4. History of noncompliance.

5. History of atrial fibrillation.

6. History of cerebrovascular accident, transient ischemic attack.

7. History of congestive heart failure.

8. History of gastroesophageal reflux disease.

9. Hypertension.



DISCHARGE DISPOSITION.:

The patient will be discharged in stable condition with guarded prognosis. Cardiology

cleared the patient for discharge.



HISTORY OF PRESENT ILLNESS:

This 72-year-old woman with a past medical history of multiple medical problems was

admitted chest pain as well as AICD discharge. Atrial ablation was noted.  The patient

had history of noncompliance and was treated in consultation with Cardiology.  Patient

improved significantly.  Cardiology cleared the patient for discharge.



On exam, vitals are stable.

CARDIOVASCULAR SYSTEM:  S1, S2 muffled.

ABDOMEN: Soft.

NERVOUS SYSTEM: No focal deficit.



Importance of compliance was stressed with the patient.



DISCHARGE ADVICE AND MEDICATIONS:

1. Diet is cardiac.

2. Activity limited until followup.

3. Follow up with Dr. Lauren Gilliam in 2-3 days.

4. Follow up with Dr. Cade as advised.

5. Tylenol 650 q.6 p.r.n.

6. Eliquis 5 mg p.o. b.i.d.

7. Lipitor 40 mg at bedtime.

8. Coreg 12.5 mg p.o. b.i.d.

9. Lasix 40 mg p.o. b.i.d.

10.Apresoline 50 mg p.o. q.i.d. p.r.n.

11.Zestril 20 mg p.o. b.i.d.

12.Procardia XL 60 mg p.o. daily.

Once again, the patient will be discharged in stable condition with guarded prognosis.





MMODL / IJN: [id number] / Job#: [id number]

## 2018-05-10 NOTE — CDI
Last Revision, December 2017





            Documentation Clarification Form



Date: 5/10/18 1101

From: Nkechi Arteaga RN, CCDS

Phone: [phone]

MRN: [id number]

Admit Date: 5/7/2018 8:52:00 PM

Patient Name: Kirsty Garg 

Visit Number: [id number]



ATTENTION: The Clinical Documentation Specialists (CDI) and [facility] Coding Staff 
appreciate your assistance in clarifying documentation. Please respond to the 
clarification below the line at the bottom and electronically sign. The CDI & 
[facility] Coding staff will review the response and follow-up if needed. Please note: 
Queries are made part of the Legal Health Record. If you have any questions, 
please contact the author of this message via ITS.



Dr. Cotton/ Shy Shah CNP



History/Risk Factors:

Hypertension, history of breast cancer, history of GERD, hyperlipidemia, atrial 
fibrillation. 



Clinical Indicators:  

VS/Pulse OX: Temp 97.4, HR 77, rr18, B/P 194/88, spo2 96% ra

BNP: not done

Echocardiogram Results: Ef 35-40%

Chest X Ray: not done



Treatment:  

Lasix 40 mg PO BID



In your professional opinion, can you please clarify the acuity and type of CHF 
if known?



Systolic Heart Failure:

Acute 

Chronic

Acute on Chronic  



Diastolic Heart Failure:

Acute 

Chronic

Acute on Chronic



Systolic & Diastolic Heart Failure:

Acute 

Chronic

Acute on Chronic Heart Failure



Unable to Determine

Other, please specify

   



Please continue to document in your progress notes and discharge summary in 
order to capture severity of illness and risk of mortality. Include clinical 
findings that support your diagnosis.

___________________________________________________________________
MTDD